# Patient Record
Sex: MALE | Employment: STUDENT | URBAN - METROPOLITAN AREA
[De-identification: names, ages, dates, MRNs, and addresses within clinical notes are randomized per-mention and may not be internally consistent; named-entity substitution may affect disease eponyms.]

---

## 2019-03-05 ENCOUNTER — HOSPITAL ENCOUNTER (EMERGENCY)
Facility: CLINIC | Age: 24
Discharge: HOME | End: 2019-03-05
Attending: EMERGENCY MEDICINE | Admitting: EMERGENCY MEDICINE

## 2019-03-05 ENCOUNTER — APPOINTMENT (OUTPATIENT)
Dept: GENERAL RADIOLOGY | Facility: CLINIC | Age: 24
End: 2019-03-05
Attending: EMERGENCY MEDICINE

## 2019-03-05 VITALS
HEIGHT: 67 IN | TEMPERATURE: 97.6 F | SYSTOLIC BLOOD PRESSURE: 138 MMHG | HEART RATE: 68 BPM | RESPIRATION RATE: 16 BRPM | WEIGHT: 157.3 LBS | BODY MASS INDEX: 24.69 KG/M2 | OXYGEN SATURATION: 98 % | DIASTOLIC BLOOD PRESSURE: 68 MMHG

## 2019-03-05 DIAGNOSIS — S80.12XA CONTUSION OF MULTIPLE SITES OF LEFT LOWER EXTREMITY, INITIAL ENCOUNTER: ICD-10-CM

## 2019-03-05 PROCEDURE — 99282 EMERGENCY DEPT VISIT SF MDM: CPT | Mod: Z6 | Performed by: EMERGENCY MEDICINE

## 2019-03-05 PROCEDURE — 99283 EMERGENCY DEPT VISIT LOW MDM: CPT | Performed by: EMERGENCY MEDICINE

## 2019-03-05 PROCEDURE — 73590 X-RAY EXAM OF LOWER LEG: CPT | Mod: LT

## 2019-03-05 SDOH — HEALTH STABILITY: MENTAL HEALTH: HOW OFTEN DO YOU HAVE A DRINK CONTAINING ALCOHOL?: NEVER

## 2019-03-05 ASSESSMENT — MIFFLIN-ST. JEOR: SCORE: 1662.14

## 2019-03-05 NOTE — ED AVS SNAPSHOT
[facility], [place], Emergency Department  [address] [place] [id number]  Phone:  [phone]                                    Sultan Pastrana   MRN: [id number]    Department:  [facility], Emergency Department   Date of Visit:  3/5/2019           After Visit Summary Signature Page    I have received my discharge instructions, and my questions have been answered. I have discussed any challenges I see with this plan with the nurse or doctor.    ..........................................................................................................................................  Patient/Patient Representative Signature      ..........................................................................................................................................  Patient Representative Print Name and Relationship to Patient    ..................................................               ................................................  Date                                   Time    ..........................................................................................................................................  Reviewed by Signature/Title    ...................................................              ..............................................  Date                                               Time          22EPIC Rev 08/18

## 2019-03-06 NOTE — ED TRIAGE NOTES
"Triage Assessment & Note:    /67   Pulse 66   Temp 97.6  F (36.4  C) (Oral)   Resp 16   Ht 1.702 m (5' 7\")   Wt 71.4 kg (157 lb 4.8 oz)   SpO2 98%   BMI 24.64 kg/m        Patient presents with: Pt comes to triage with reports of getting kicked in the left leg while playing soccer a week ago.  Pt has large bruise and two \"bumps.\" CMS WNL.  No reports of pain, cough, SOB, CP, or fever.      Home Treatments/Remedies: n/a    Febrile / Afebrile: afebrile    Duration of C/o: 1 wk    Terese Hartman RN  March 5, 2019          "

## 2019-03-06 NOTE — ED PROVIDER NOTES
"    [place] EMERGENCY DEPARTMENT ([facility])  3/05/19    History     Chief Complaint   Patient presents with     Leg Swelling     leg got kicked 1 wk ago bruising on leg, no pain     The history is provided by the patient.     Sultan Pastrana is a 24 year old male with no significant past medical history who presents to the Emergency Department today with left leg swelling. Patient reports that he was playing soccer 1 week ago and got kicked in his left leg. Patient reports there was initially pain but that resolved after one day. Patient reports that it has been swelling since that time and is bruised.    I have reviewed the Medications, Allergies, Past Medical and Surgical History, and Social History in the Epic system.    History reviewed. No pertinent past medical history.    History reviewed. No pertinent surgical history.    History reviewed. No pertinent family history.    Social History     Tobacco Use     Smoking status: Light Tobacco Smoker     Smokeless tobacco: Never Used   Substance Use Topics     Alcohol use: No     Frequency: Never       No current facility-administered medications for this encounter.      No current outpatient medications on file.      Not on File      Review of Systems   ROS: 14 point ROS neg other than the symptoms noted above in the HPI.      Physical Exam   BP: 141/67  Pulse: 66  Temp: 97.6  F (36.4  C)  Resp: 16  Height: 170.2 cm (5' 7\")  Weight: 71.4 kg (157 lb 4.8 oz)  SpO2: 98 %      Physical Exam   GEN: Well appearing, non toxic, cooperative.   HEENT: The head is normocephalic and atraumatic. Pupils are equal round and reactive to light. Extraocular motions are intact. There is no facial swelling. The neck is nontender and supple.   CV: Regular rate and rhythm without murmurs rubs or gallops. 2+ radial pulses bilaterally.  PULM: Clear to auscultation bilaterally.  ABD: Soft, nontender, nondistended.   EXT: Full range of motion.  Left anterior shin with large area " of ecchymosis and 2 areas of nontender swelling.  NEURO: Cranial nerves II through XII are intact and symmetric. Bilateral upper and lower extremities grossly show full range of motion without any focal deficits.   SKIN: No rashes, ecchymosis, or lacerations  PSYCH: Calm and cooperative, interactive.       ED Course   6:53 PM  The patient was seen and examined by Marko Buitrago MD in Room HWB.        Procedures             Critical Care time:  none     Results for orders placed or performed during the hospital encounter of 03/05/19   XR Tibia & Fibula Left 2 Views    Narrative    Exam: XR TIBIA & FIBULA LT 2 VW, 3/5/2019 7:47 PM    Indication: trauma    Comparison: No prior imaging available.    Findings:   AP, lateral views of the left tibia and fibula. No acute fracture. The  knee and ankle joints appear congruent. No joint effusion. There is  moderate soft tissue swelling about the lower leg. Soft tissues about  the leg are otherwise unremarkable.      Impression    Impression:   1. No acute osseous findings.  2. Moderate soft tissue swelling about the lower leg.    I have personally reviewed the examination and initial interpretation  and I agree with the findings.    VIKY MCRAE MD               Labs Ordered and Resulted from Time of ED Arrival Up to the Time of Departure from the ED - No data to display         Assessments & Plan (with Medical Decision Making)   Patient is a previously healthy 24-year-old male who presents with ecchymosis and swelling to his left shin after being kicked 1 week ago.  Initial vitals were within normal limits.  Exam as above, notable for ecchymosis and areas of swelling but the entire leg is nontender.  X-ray was obtained of the left tib-fib and negative for any fractures.  I suspect this is just a small hematoma involved with the ecchymosis.  Ace bandage was placed and patient was instructed on RICE.  Patient was discharged home in stable condition and given strict return  precautions.    I have reviewed the nursing notes.    I have reviewed the findings, diagnosis, plan and need for follow up with the patient.       Medication List      There are no discharge medications for this visit.         Final diagnoses:   Contusion of multiple sites of left lower extremity, initial encounter     IErick, am serving as a trained medical scribe to document services personally performed by Marko Buitrago MD, based on the provider's statements to me.   IMarko MD, was physically present and have reviewed and verified the accuracy of this note documented by Erick Armstrong.    3/5/2019   [facility], [place], EMERGENCY DEPARTMENT     Marko Buitrago MD  03/05/19 2017